# Patient Record
Sex: FEMALE | Race: ASIAN | Employment: STUDENT | URBAN - METROPOLITAN AREA
[De-identification: names, ages, dates, MRNs, and addresses within clinical notes are randomized per-mention and may not be internally consistent; named-entity substitution may affect disease eponyms.]

---
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
## 2024-08-10 ENCOUNTER — HOSPITAL ENCOUNTER (OUTPATIENT)
Age: 19
Discharge: HOME | End: 2024-08-10
Attending: EMERGENCY MEDICINE
Payer: COMMERCIAL

## 2024-08-10 VITALS
OXYGEN SATURATION: 100 % | HEIGHT: 65 IN | TEMPERATURE: 98 F | WEIGHT: 150 LBS | HEART RATE: 56 BPM | RESPIRATION RATE: 20 BRPM | BODY MASS INDEX: 24.99 KG/M2 | SYSTOLIC BLOOD PRESSURE: 127 MMHG | DIASTOLIC BLOOD PRESSURE: 68 MMHG

## 2024-08-10 DIAGNOSIS — L73.9 FOLLICULITIS: Primary | ICD-10-CM

## 2024-08-10 PROCEDURE — 99203 OFFICE O/P NEW LOW 30 MIN: CPT

## 2024-08-10 PROCEDURE — 99204 OFFICE O/P NEW MOD 45 MIN: CPT

## 2024-08-10 RX ORDER — DOXYCYCLINE HYCLATE 100 MG/1
100 CAPSULE ORAL 2 TIMES DAILY
Qty: 14 CAPSULE | Refills: 0 | Status: SHIPPED | OUTPATIENT
Start: 2024-08-10 | End: 2024-08-17

## 2024-08-10 RX ORDER — TRETINOIN 0.1 MG/G
GEL TOPICAL NIGHTLY
COMMUNITY

## 2024-08-10 NOTE — ED PROVIDER NOTES
Patient Seen in: [facility]      History     Chief Complaint   Patient presents with    Rash Skin Problem     Stated Complaint: Skin irritation    Subjective:   HPI    19-year-old female complaining of rash on the patient's monitor rash over the last few days in her left proximal thigh on the lateral aspect and then the last couple days a little irritation somewhat similar in the left axilla.  States there was a small and a drainage initially from the left thigh and it was mildly irritating not severe pain or itching.  No fever or chills.    Objective:   History reviewed. No pertinent past medical history.           History reviewed. No pertinent surgical history.             Social History     Socioeconomic History    Marital status: Single   Tobacco Use    Smoking status: Never    Smokeless tobacco: Never   Vaping Use    Vaping status: Never Used   Substance and Sexual Activity    Alcohol use: Yes     Comment: occasional    Drug use: Never              Review of Systems    Positive for stated Chief Complaint: Rash Skin Problem    Other systems are as noted in HPI.  Constitutional and vital signs reviewed.      All other systems reviewed and negative except as noted above.    Physical Exam     ED Triage Vitals [08/10/24 0840]   /68   Pulse 56   Resp 20   Temp 97.8 °F (36.6 °C)   Temp src Temporal   SpO2 100 %   O2 Device None (Room air)       Current Vitals:   Vital Signs  BP: 127/68  Pulse: 56  Resp: 20  Temp: 97.8 °F (36.6 °C)  Temp src: Temporal    Oxygen Therapy  SpO2: 100 %  O2 Device: None (Room air)            Physical Exam    Patient is alert and orient x 3 no acute distress left axilla area there is a few areas is slightly reddened 1 to 2 mm lesions.  On the left lateral proximal thigh there is an area of a few lesions of reddened base approximately 3 to 5 mm and then some of these there is a tiny pustule in the center.  This is whitish.  These seem to be associated with a hair  follicle.  There is no abscess.    ED Course   Labs Reviewed - No data to display                   MDM     Initial differential diagnoses considered but not limited to includes shingles folliculitis urticaria dermatitis     This rash seems consistent with folliculitis patient was given doxycycline and mupirocin ointment advised follow-up doctor in few days return if worse.                               MDM    Disposition and Plan     Clinical Impression:  1. Folliculitis         Disposition:  Discharge  8/10/2024  9:03 am    Follow-up:  Alee Galeas MD  [address] [address]  [phone]    In 1 week  As needed          Medications Prescribed:  Current Discharge Medication List        START taking these medications    Details   mupirocin 2 % External Ointment Apply 1 Application topically 3 (three) times daily for 14 days.  Qty: 1 each, Refills: 0      doxycycline 100 MG Oral Cap Take 1 capsule (100 mg total) by mouth 2 (two) times daily for 7 days.  Qty: 14 capsule, Refills: 0

## 2024-08-10 NOTE — DISCHARGE INSTRUCTIONS
Use the medications as prescribed recheck with your doctor in a few days if not better return for worsening symptoms.

## 2024-08-10 NOTE — ED INITIAL ASSESSMENT (HPI)
Pt presents with red itchy rash to upper back L thigh presenting like a pimple 1 week ago, similar red spots under L arm